# Patient Record
Sex: MALE | Race: WHITE | NOT HISPANIC OR LATINO | Employment: FULL TIME | URBAN - METROPOLITAN AREA
[De-identification: names, ages, dates, MRNs, and addresses within clinical notes are randomized per-mention and may not be internally consistent; named-entity substitution may affect disease eponyms.]

---

## 2020-02-25 ENCOUNTER — APPOINTMENT (OUTPATIENT)
Dept: URGENT CARE | Facility: CLINIC | Age: 55
End: 2020-02-25
Payer: OTHER MISCELLANEOUS

## 2020-02-25 ENCOUNTER — APPOINTMENT (OUTPATIENT)
Dept: RADIOLOGY | Facility: CLINIC | Age: 55
End: 2020-02-25
Payer: OTHER MISCELLANEOUS

## 2020-02-25 DIAGNOSIS — S69.92XA INJURY OF FINGER OF LEFT HAND, INITIAL ENCOUNTER: ICD-10-CM

## 2020-02-25 DIAGNOSIS — S69.92XA INJURY OF FINGER OF LEFT HAND, INITIAL ENCOUNTER: Primary | ICD-10-CM

## 2020-02-25 PROCEDURE — 90471 IMMUNIZATION ADMIN: CPT

## 2020-02-25 PROCEDURE — 99283 EMERGENCY DEPT VISIT LOW MDM: CPT

## 2020-02-25 PROCEDURE — G0382 LEV 3 HOSP TYPE B ED VISIT: HCPCS

## 2020-02-25 PROCEDURE — 73140 X-RAY EXAM OF FINGER(S): CPT

## 2020-02-25 PROCEDURE — 90715 TDAP VACCINE 7 YRS/> IM: CPT

## 2020-02-27 ENCOUNTER — APPOINTMENT (OUTPATIENT)
Dept: URGENT CARE | Facility: CLINIC | Age: 55
End: 2020-02-27
Payer: OTHER MISCELLANEOUS

## 2020-02-27 PROCEDURE — 99212 OFFICE O/P EST SF 10 MIN: CPT

## 2022-12-15 ENCOUNTER — APPOINTMENT (EMERGENCY)
Dept: CT IMAGING | Facility: HOSPITAL | Age: 57
End: 2022-12-15

## 2022-12-15 ENCOUNTER — HOSPITAL ENCOUNTER (EMERGENCY)
Facility: HOSPITAL | Age: 57
Discharge: HOME | End: 2022-12-16
Attending: EMERGENCY MEDICINE

## 2022-12-15 ENCOUNTER — APPOINTMENT (EMERGENCY)
Dept: RADIOLOGY | Facility: HOSPITAL | Age: 57
End: 2022-12-15

## 2022-12-15 DIAGNOSIS — Z78.9 ALCOHOL INGESTION: ICD-10-CM

## 2022-12-15 DIAGNOSIS — T07.XXXA MULTIPLE LACERATIONS: ICD-10-CM

## 2022-12-15 DIAGNOSIS — S06.0XAA CONCUSSION: ICD-10-CM
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
## 2022-12-15 LAB
ANION GAP SERPL CALCULATED.3IONS-SCNC: 8 MMOL/L (ref 4–13)
BASOPHILS # BLD AUTO: 0.07 THOUSANDS/ÂΜL (ref 0–0.1)
BASOPHILS NFR BLD AUTO: 1 % (ref 0–1)
BUN SERPL-MCNC: 16 MG/DL (ref 5–25)
CALCIUM SERPL-MCNC: 9 MG/DL (ref 8.4–10.2)
CHLORIDE SERPL-SCNC: 99 MMOL/L (ref 96–108)
CO2 SERPL-SCNC: 25 MMOL/L (ref 21–32)
CREAT SERPL-MCNC: 1.1 MG/DL (ref 0.6–1.3)
EOSINOPHIL # BLD AUTO: 0.22 THOUSAND/ÂΜL (ref 0–0.61)
EOSINOPHIL NFR BLD AUTO: 2 % (ref 0–6)
ERYTHROCYTE [DISTWIDTH] IN BLOOD BY AUTOMATED COUNT: 11.9 % (ref 11.6–15.1)
GFR SERPL CREATININE-BSD FRML MDRD: 74 ML/MIN/1.73SQ M
GLUCOSE SERPL-MCNC: 88 MG/DL (ref 65–140)
HCT VFR BLD AUTO: 42.9 % (ref 36.5–49.3)
HGB BLD-MCNC: 14.6 G/DL (ref 12–17)
IMM GRANULOCYTES # BLD AUTO: 0.04 THOUSAND/UL (ref 0–0.2)
IMM GRANULOCYTES NFR BLD AUTO: 0 % (ref 0–2)
LYMPHOCYTES # BLD AUTO: 2.96 THOUSANDS/ÂΜL (ref 0.6–4.47)
LYMPHOCYTES NFR BLD AUTO: 31 % (ref 14–44)
MCH RBC QN AUTO: 31.5 PG (ref 26.8–34.3)
MCHC RBC AUTO-ENTMCNC: 34 G/DL (ref 31.4–37.4)
MCV RBC AUTO: 93 FL (ref 82–98)
MONOCYTES # BLD AUTO: 1.05 THOUSAND/ÂΜL (ref 0.17–1.22)
MONOCYTES NFR BLD AUTO: 11 % (ref 4–12)
NEUTROPHILS # BLD AUTO: 5.24 THOUSANDS/ÂΜL (ref 1.85–7.62)
NEUTS SEG NFR BLD AUTO: 55 % (ref 43–75)
NRBC BLD AUTO-RTO: 0 /100 WBCS
PLATELET # BLD AUTO: 223 THOUSANDS/UL (ref 149–390)
PMV BLD AUTO: 9.6 FL (ref 8.9–12.7)
POTASSIUM SERPL-SCNC: 3.5 MMOL/L (ref 3.5–5.3)
RBC # BLD AUTO: 4.63 MILLION/UL (ref 3.88–5.62)
SODIUM SERPL-SCNC: 132 MMOL/L (ref 135–147)
WBC # BLD AUTO: 9.58 THOUSAND/UL (ref 4.31–10.16)

## 2022-12-15 RX ORDER — LIDOCAINE HYDROCHLORIDE 20 MG/ML
10 INJECTION, SOLUTION EPIDURAL; INFILTRATION; INTRACAUDAL; PERINEURAL ONCE
Status: COMPLETED | OUTPATIENT
Start: 2022-12-15 | End: 2022-12-15

## 2022-12-15 RX ADMIN — LIDOCAINE HYDROCHLORIDE 10 ML: 20 INJECTION, SOLUTION EPIDURAL; INFILTRATION; INTRACAUDAL; PERINEURAL at 22:55

## 2022-12-15 RX ADMIN — IOHEXOL 100 ML: 350 INJECTION, SOLUTION INTRAVENOUS at 22:45

## 2022-12-16 VITALS
TEMPERATURE: 97.5 F | DIASTOLIC BLOOD PRESSURE: 61 MMHG | HEART RATE: 83 BPM | WEIGHT: 289.24 LBS | OXYGEN SATURATION: 92 % | SYSTOLIC BLOOD PRESSURE: 132 MMHG | RESPIRATION RATE: 18 BRPM

## 2022-12-16 LAB
ATRIAL RATE: 91 BPM
P AXIS: 67 DEGREES
PR INTERVAL: 146 MS
QRS AXIS: 71 DEGREES
QRSD INTERVAL: 86 MS
QT INTERVAL: 346 MS
QTC INTERVAL: 425 MS
T WAVE AXIS: 50 DEGREES
VENTRICULAR RATE: 91 BPM

## 2022-12-16 RX ORDER — GINSENG 100 MG
1 CAPSULE ORAL ONCE
Status: DISCONTINUED | OUTPATIENT
Start: 2022-12-16 | End: 2022-12-16

## 2022-12-16 RX ORDER — BACITRACIN, NEOMYCIN, POLYMYXIN B 400; 3.5; 5 [USP'U]/G; MG/G; [USP'U]/G
1 OINTMENT TOPICAL ONCE
Status: COMPLETED | OUTPATIENT
Start: 2022-12-16 | End: 2022-12-16

## 2022-12-16 RX ADMIN — BACITRACIN ZINC, NEOMYCIN, POLYMYXIN B 1 SMALL APPLICATION: 400; 3.5; 5 OINTMENT TOPICAL at 00:49

## 2022-12-16 NOTE — TRAUMA DOCUMENTATION
L forehead Lac 6cm irregular (5 sutures)  L cheek 3cm not through (4 sutures)  L eyebrow 2cm  lac (glue)  L cheek 6 5 cm lac (6 sutures)  L side hematoma  R& L TM clear

## 2022-12-16 NOTE — ED PROVIDER NOTES
History  Chief Complaint   Patient presents with   • Fall - Major     Pt fell down 15-17 steps, unknown LOC ,+ETOH     Is a 46-year-old male he was brought in for fall down a full flight of stairs  He does not know how or why he fell  He notes he had significant amount of alcohol tonight  He does not know exactly how much  He reports some pain in his head around the area of multiple lacerations  Reports he otherwise feels well  History somewhat limited by his alcohol intoxication  None       Past Medical History:   Diagnosis Date   • Alcohol abuse        No past surgical history on file  No family history on file  I have reviewed and agree with the history as documented  E-Cigarette/Vaping     E-Cigarette/Vaping Substances          Review of Systems   Constitutional: Negative for chills and fever  Eyes: Negative for visual disturbance  Respiratory: Negative for shortness of breath  Cardiovascular: Negative for chest pain  Gastrointestinal: Negative for abdominal distention and abdominal pain  Endocrine: Negative for polyuria  Genitourinary: Negative for decreased urine volume and dysuria  Neurological: Negative for dizziness, syncope and weakness  Physical Exam  Physical Exam  Constitutional:       General: He is not in acute distress  Appearance: He is well-developed  He is not ill-appearing, toxic-appearing or diaphoretic  HENT:      Head: Normocephalic and atraumatic  Right Ear: Tympanic membrane, ear canal and external ear normal       Left Ear: Tympanic membrane, ear canal and external ear normal       Nose: Nose normal       Mouth/Throat:      Mouth: Mucous membranes are moist       Pharynx: Oropharynx is clear  Eyes:      Conjunctiva/sclera: Conjunctivae normal       Pupils: Pupils are equal, round, and reactive to light  Neck:      Comments: Cervical collar in place  Cardiovascular:      Rate and Rhythm: Normal rate and regular rhythm        Heart sounds: Normal heart sounds  Pulmonary:      Effort: Pulmonary effort is normal  No respiratory distress  Breath sounds: Normal breath sounds  Abdominal:      General: Bowel sounds are normal  There is no distension  Palpations: Abdomen is soft  Tenderness: There is no abdominal tenderness  There is no right CVA tenderness, left CVA tenderness, guarding or rebound  Musculoskeletal:         General: Normal range of motion  Skin:     General: Skin is warm  Comments: Multiple lacerations across the left face  Left forehead has an irregular 6 cm laceration, stellate in shape  Left cheek has a 3 cm medial laceration  Left eyebrow has a 2 cm laceration  Left cheek has a 6-1/2 cm deep laceration  There is left-sided hematoma  Neurological:      Mental Status: He is alert and oriented to person, place, and time  Psychiatric:         Behavior: Behavior normal          Thought Content:  Thought content normal          Judgment: Judgment normal          Vital Signs  ED Triage Vitals   Temperature Pulse Respirations Blood Pressure SpO2   12/15/22 2225 12/15/22 2225 12/15/22 2225 12/15/22 2225 12/15/22 2225   97 5 °F (36 4 °C) 89 18 129/62 97 %      Temp Source Heart Rate Source Patient Position - Orthostatic VS BP Location FiO2 (%)   12/15/22 2229 12/15/22 2225 12/15/22 2225 12/15/22 2245 --   Oral Monitor Lying Left arm       Pain Score       12/15/22 2225       No Pain           Vitals:    12/15/22 2315 12/15/22 2345 12/16/22 0015 12/16/22 0040   BP: 121/61 131/68 131/63 132/61   Pulse: 90 93 90 83   Patient Position - Orthostatic VS: Lying Lying Lying Lying         Visual Acuity  Visual Acuity    Flowsheet Row Most Recent Value   L Pupil Size (mm) 4   R Pupil Size (mm) 4          ED Medications  Medications   iohexol (OMNIPAQUE) 350 MG/ML injection (SINGLE-DOSE) 100 mL (100 mL Intravenous Given 12/15/22 2245)   lidocaine (PF) (XYLOCAINE-MPF) 2 % injection 10 mL (10 mL Infiltration Given 12/15/22 2255)   neomycin-bacitracin-polymyxin b (NEOSPORIN) ointment 1 small application (1 small application Topical Given 12/16/22 0049)       Diagnostic Studies  Results Reviewed     Procedure Component Value Units Date/Time    Basic metabolic panel [[id number]]  (Abnormal) Collected: 12/15/22 2237    Lab Status: Final result Specimen: Blood from Arm, Right Updated: 12/15/22 2302     Sodium 132 mmol/L      Potassium 3 5 mmol/L      Chloride 99 mmol/L      CO2 25 mmol/L      ANION GAP 8 mmol/L      BUN 16 mg/dL      Creatinine 1 10 mg/dL      Glucose 88 mg/dL      Calcium 9 0 mg/dL      eGFR 74 ml/min/1 73sq m     Narrative:      [place] guidelines for Chronic Kidney Disease (CKD):   •  Stage 1 with normal or high GFR (GFR > 90 mL/min/1 73 square meters)  •  Stage 2 Mild CKD (GFR = 60-89 mL/min/1 73 square meters)  •  Stage 3A Moderate CKD (GFR = 45-59 mL/min/1 73 square meters)  •  Stage 3B Moderate CKD (GFR = 30-44 mL/min/1 73 square meters)  •  Stage 4 Severe CKD (GFR = 15-29 mL/min/1 73 square meters)  •  Stage 5 End Stage CKD (GFR <15 mL/min/1 73 square meters)  Note: GFR calculation is accurate only with a steady state creatinine    CBC and differential [[id number]] Collected: 12/15/22 2237    Lab Status: Final result Specimen: Blood from Arm, Right Updated: 12/15/22 2241     WBC 9 58 Thousand/uL      RBC 4 63 Million/uL      Hemoglobin 14 6 g/dL      Hematocrit 42 9 %      MCV 93 fL      MCH 31 5 pg      MCHC 34 0 g/dL      RDW 11 9 %      MPV 9 6 fL      Platelets 371 Thousands/uL      nRBC 0 /100 WBCs      Neutrophils Relative 55 %      Immat GRANS % 0 %      Lymphocytes Relative 31 %      Monocytes Relative 11 %      Eosinophils Relative 2 %      Basophils Relative 1 %      Neutrophils Absolute 5 24 Thousands/µL      Immature Grans Absolute 0 04 Thousand/uL      Lymphocytes Absolute 2 96 Thousands/µL      Monocytes Absolute 1 05 Thousand/µL      Eosinophils Absolute 0 22 Thousand/µL      Basophils Absolute 0 07 Thousands/µL                  TRAUMA - CT chest abdomen pelvis w contrast   Final Result by Chris Shultz DO (12/15 2303)      No acute traumatic injury identified  The study was marked in [facility] for immediate notification, per trauma protocol  Workstation performed: [id number]         TRAUMA - CT head wo contrast   Final Result by Chris Shultz DO (12/15 2304)      No intracranial hemorrhage or calvarial fracture  Left facial and left frontal scalp soft tissue edema/lacerations  Workstation performed: [id number]         TRAUMA - CT spine cervical wo contrast   Final Result by Chris Shultz DO (12/15 2304)      No cervical spine fracture or traumatic malalignment  Workstation performed: [id number]         XR Trauma chest portable   Final Result by Chris Shultz DO (12/15 2315)      No acute cardiopulmonary disease  Workstation performed: [id number]         XR Trauma pelvis ap only 1 or 2 vw   Final Result by Chris Shultz DO (12/15 2316)      No acute osseous abnormality  Workstation performed: [id number]                    Procedures  Laceration repair    Date/Time: 12/17/2022 11:25 PM  Performed by: Mitch Umaña MD  Authorized by: Mitch Umaña MD   Consent given by: patient  Patient understanding: patient states understanding of the procedure being performed  Radiology Images displayed and confirmed   If images not available, report reviewed: imaging studies available  Patient identity confirmed: verbally with patient and arm band  Tendon involvement: none  Nerve involvement: none  Vascular damage: no  Anesthesia: local infiltration    Anesthesia:  Local Anesthetic: lidocaine 2% without epinephrine  Anesthetic total: 3 mL    Sedation:  Patient sedated: no      Wound Dehiscence:  Superficial Wound Dehiscence: simple closure      Procedure Details:  Irrigation solution: tap water  Irrigation method: tap  Amount of cleaning: standard  Skin closure: 5-0 nylon  Number of sutures: 5  Technique: simple  Approximation: close  Approximation difficulty: simple  Dressing: antibiotic ointment  Patient tolerance: patient tolerated the procedure well with no immediate complications  Comments: Left forehead, 5 sutures    Laceration repair    Date/Time: 12/17/2022 11:28 PM  Performed by: Mitch Umaña MD  Authorized by: Mitch Umaña MD   Consent given by: patient  Patient understanding: patient states understanding of the procedure being performed  Radiology Images displayed and confirmed  If images not available, report reviewed: imaging studies available  Patient identity confirmed: arm band and verbally with patient  Laceration length: 3 cm  Tendon involvement: none  Nerve involvement: none    Anesthesia:  Local Anesthetic: lidocaine 2% without epinephrine    Wound Dehiscence:  Superficial Wound Dehiscence: simple closure      Procedure Details:  Irrigation solution: saline  Irrigation method: tap  Amount of cleaning: standard  Skin closure: 5-0 nylon  Number of sutures: 4  Technique: simple  Approximation: close  Approximation difficulty: simple  Dressing: antibiotic ointment  Patient tolerance: patient tolerated the procedure well with no immediate complications  Comments: Same technique used for left lateral cheek however that was also given 1 deep suture and 6 superficial sutures  Chromic Gut used on deep, nylon used for superficial                ED Course                                             MDM  Number of Diagnoses or Management Options  Alcohol ingestion: new and requires workup  Concussion: new and requires workup  Fall, initial encounter: new and requires workup  Multiple lacerations  Diagnosis management comments: Trauma Secondary exam performed (must be performed and documented on all traumas): GCS 15, full ROM of bilateral upper and lower extremities   Airway intact, bilateral breath sounds, palpable pulses  No bony point tenderness in extremities, chest, abdomen or c/t,l spine  No crepitus, abdomen soft/non tender  Chest wall soft non tender with no deformities  Pelvis stable  Fast or pelvic xray prior to ordering a CT a/p? Yes     Stat portable CXR prior to going to CT chest? Yes     Document C-spine clearance after CT c-spine came back normal - cspineclearsmartlist: Of note, C-spine clear after CT scans resulted  Is a 71-year-old male who had a fall resulting in concussion after a large amount of alcohol ingestion  Multiple lacerations were successfully repaired  His wife was sober accompanied him home  Extensive scans showed no acute findings  He appeared clinically well  Discussed warning signs and symptoms with the patient as well as when to return to the emergency department versus follow up with PC P  Patient states understanding and agreement with the plan  Patient care delayed due to critical capacity in the emergency department  This note was completed using dictation software  Amount and/or Complexity of Data Reviewed  Clinical lab tests: ordered and reviewed  Tests in the radiology section of CPT®: ordered and reviewed        Disposition  Final diagnoses:   Fall, initial encounter   Multiple lacerations   Concussion   Alcohol ingestion     Time reflects when diagnosis was documented in both MDM as applicable and the Disposition within this note     Time User Action Codes Description Comment    12/16/2022 12:09 AM Karen Feldman Add Lucio Katelyn  HNIG] Fall, initial encounter     12/16/2022 12:09 AM Karen Feldman Add [T07  XXXA] Multiple lacerations     12/16/2022 12:10 AM Karen Feldman Add [S06  0XAA] Concussion     12/16/2022 12:10 AM Karen Feldman Add [Z78 9] Alcohol ingestion       ED Disposition     ED Disposition   Discharge    Condition   Stable    Date/Time   Fri Dec 16, 2022 12:09 AM    Comment   Sheyla Cook discharge to home/self care  Follow-up Information     Follow up With Specialties Details Why Contact Info    pcp              There are no discharge medications for this patient  No discharge procedures on file      PDMP Review     None          ED Provider  Electronically Signed by           Hector Cook MD  12/17/22 8522

## 2022-12-22 ENCOUNTER — OFFICE VISIT (OUTPATIENT)
Dept: URGENT CARE | Facility: CLINIC | Age: 57
End: 2022-12-22

## 2022-12-22 VITALS
HEIGHT: 68 IN | DIASTOLIC BLOOD PRESSURE: 70 MMHG | TEMPERATURE: 98 F | HEART RATE: 106 BPM | WEIGHT: 289 LBS | SYSTOLIC BLOOD PRESSURE: 140 MMHG | RESPIRATION RATE: 18 BRPM | BODY MASS INDEX: 43.8 KG/M2 | OXYGEN SATURATION: 96 %

## 2022-12-22 DIAGNOSIS — Z48.02 ENCOUNTER FOR REMOVAL OF SUTURES: Primary | ICD-10-CM

## 2022-12-22 NOTE — PROGRESS NOTES
[facility]        NAME: Jay Perkins is a 65 y o  male  : 1965    MRN: [id number]  DATE: 2022  TIME: 4:19 PM    Assessment and Plan   Encounter for removal of sutures [Z48 02]  1  Encounter for removal of sutures          Suture removal    Date/Time: 2022 8:30 AM  Performed by: JASON Soto  Authorized by: JASON Soto   Universal Protocol:  Procedure performed by:  Risks and benefits: risks, benefits and alternatives were discussed  Consent given by: patient  Time out: Immediately prior to procedure a "time out" was called to verify the correct patient, procedure, equipment, support staff and site/side marked as required  Patient understanding: patient states understanding of the procedure being performed  Patient identity confirmed: verbally with patient        Patient location:  Clinic  Location:     Location:   [facility] location:  Forehead (and cheek)  Procedure details: Tools used:  Suture removal kit    Wound appearance:  No sign(s) of infection, good wound healing and clean    Number of sutures removed:  13  Post-procedure details:     Post-removal:  Antibiotic ointment applied    Patient tolerance of procedure: Tolerated well, no immediate complications  Comments:      5 removed from forehead, 6 cheek and 2 above upper lip (1 fell out)          Patient Instructions       Follow up with PCP in 3-5 days  Proceed to  ER if symptoms worsen  Chief Complaint     Chief Complaint   Patient presents with   • Suture / Staple Removal     Suture removal from 3 areas of this face  He fell down the steps 12/15  Was seen at [facility]  ED         History of Present Illness       Suture / Staple Removal  The sutures were placed 7 to 10 days ago  He tried antibiotic ointment use since the wound repair  His temperature was unmeasured prior to arrival  There has been no drainage from the wound  There is no redness present   There is no swelling present  There is no pain present  He has no difficulty moving the affected extremity or digit  Review of Systems   Review of Systems   Constitutional: Negative for chills, diaphoresis, fatigue and fever  Respiratory: Negative  Cardiovascular: Negative  Musculoskeletal: Negative  Skin: Positive for wound  Neurological: Negative  Current Medications     No current outpatient medications on file  Current Allergies     Allergies as of 12/22/2022   • (No Known Allergies)            The following portions of the patient's history were reviewed and updated as appropriate: allergies, current medications, past family history, past medical history, past social history, past surgical history and problem list      Past Medical History:   Diagnosis Date   • Alcohol abuse        No past surgical history on file  No family history on file  Medications have been verified  Objective   /70   Pulse (!) 106   Temp 98 °F (36 7 °C)   Resp 18   Ht 5' 8" (1 727 m)   Wt 131 kg (289 lb)   SpO2 96%   BMI 43 94 kg/m²   No LMP for male patient  Physical Exam     Physical Exam  Constitutional:       General: He is not in acute distress  Appearance: Normal appearance  He is not diaphoretic  HENT:      Head: Normocephalic and atraumatic  Cardiovascular:      Rate and Rhythm: Normal rate and regular rhythm  Heart sounds: Normal heart sounds, S1 normal and S2 normal    Pulmonary:      Effort: Pulmonary effort is normal       Breath sounds: Normal breath sounds and air entry  Skin:     General: Skin is warm and dry  Capillary Refill: Capillary refill takes less than 2 seconds  Neurological:      Mental Status: He is alert